# Patient Record
Sex: MALE | Race: WHITE | NOT HISPANIC OR LATINO | Employment: OTHER | URBAN - METROPOLITAN AREA
[De-identification: names, ages, dates, MRNs, and addresses within clinical notes are randomized per-mention and may not be internally consistent; named-entity substitution may affect disease eponyms.]

---

## 2017-08-14 ENCOUNTER — RECORDS - HEALTHEAST (OUTPATIENT)
Dept: LAB | Facility: CLINIC | Age: 82
End: 2017-08-14

## 2017-08-14 LAB
CHOLEST SERPL-MCNC: 239 MG/DL
FASTING STATUS PATIENT QL REPORTED: ABNORMAL
HDLC SERPL-MCNC: 49 MG/DL
LDLC SERPL CALC-MCNC: 164 MG/DL
TRIGL SERPL-MCNC: 131 MG/DL

## 2019-02-18 ENCOUNTER — RECORDS - HEALTHEAST (OUTPATIENT)
Dept: LAB | Facility: CLINIC | Age: 84
End: 2019-02-18

## 2019-02-18 LAB
ANION GAP SERPL CALCULATED.3IONS-SCNC: 9 MMOL/L (ref 5–18)
BUN SERPL-MCNC: 10 MG/DL (ref 8–28)
CALCIUM SERPL-MCNC: 10.3 MG/DL (ref 8.5–10.5)
CHLORIDE BLD-SCNC: 104 MMOL/L (ref 98–107)
CHOLEST SERPL-MCNC: 257 MG/DL
CO2 SERPL-SCNC: 27 MMOL/L (ref 22–31)
CREAT SERPL-MCNC: 1.11 MG/DL (ref 0.7–1.3)
FASTING STATUS PATIENT QL REPORTED: ABNORMAL
GFR SERPL CREATININE-BSD FRML MDRD: >60 ML/MIN/1.73M2
GLUCOSE BLD-MCNC: 121 MG/DL (ref 70–125)
HDLC SERPL-MCNC: 56 MG/DL
LDLC SERPL CALC-MCNC: 167 MG/DL
POTASSIUM BLD-SCNC: 4.1 MMOL/L (ref 3.5–5)
SODIUM SERPL-SCNC: 140 MMOL/L (ref 136–145)
TRIGL SERPL-MCNC: 168 MG/DL

## 2020-02-06 ENCOUNTER — RECORDS - HEALTHEAST (OUTPATIENT)
Dept: LAB | Facility: CLINIC | Age: 85
End: 2020-02-06

## 2020-02-06 LAB
ANION GAP SERPL CALCULATED.3IONS-SCNC: 9 MMOL/L (ref 5–18)
BUN SERPL-MCNC: 12 MG/DL (ref 8–28)
CALCIUM SERPL-MCNC: 10.2 MG/DL (ref 8.5–10.5)
CHLORIDE BLD-SCNC: 103 MMOL/L (ref 98–107)
CHOLEST SERPL-MCNC: 231 MG/DL
CO2 SERPL-SCNC: 28 MMOL/L (ref 22–31)
CREAT SERPL-MCNC: 1.12 MG/DL (ref 0.7–1.3)
FASTING STATUS PATIENT QL REPORTED: ABNORMAL
GFR SERPL CREATININE-BSD FRML MDRD: >60 ML/MIN/1.73M2
GLUCOSE BLD-MCNC: 116 MG/DL (ref 70–125)
HDLC SERPL-MCNC: 51 MG/DL
LDLC SERPL CALC-MCNC: 152 MG/DL
POTASSIUM BLD-SCNC: 3.8 MMOL/L (ref 3.5–5)
SODIUM SERPL-SCNC: 140 MMOL/L (ref 136–145)
TRIGL SERPL-MCNC: 142 MG/DL

## 2020-08-11 ENCOUNTER — RECORDS - HEALTHEAST (OUTPATIENT)
Dept: LAB | Facility: CLINIC | Age: 85
End: 2020-08-11

## 2020-08-11 LAB — POTASSIUM BLD-SCNC: 3.6 MMOL/L (ref 3.5–5)

## 2021-02-09 ENCOUNTER — RECORDS - HEALTHEAST (OUTPATIENT)
Dept: LAB | Facility: CLINIC | Age: 86
End: 2021-02-09

## 2021-02-09 LAB
ANION GAP SERPL CALCULATED.3IONS-SCNC: 8 MMOL/L (ref 5–18)
BUN SERPL-MCNC: 11 MG/DL (ref 8–28)
CALCIUM SERPL-MCNC: 10.3 MG/DL (ref 8.5–10.5)
CHLORIDE BLD-SCNC: 104 MMOL/L (ref 98–107)
CHOLEST SERPL-MCNC: 242 MG/DL
CO2 SERPL-SCNC: 28 MMOL/L (ref 22–31)
CREAT SERPL-MCNC: 1.16 MG/DL (ref 0.7–1.3)
FASTING STATUS PATIENT QL REPORTED: ABNORMAL
GFR SERPL CREATININE-BSD FRML MDRD: 59 ML/MIN/1.73M2
GLUCOSE BLD-MCNC: 101 MG/DL (ref 70–125)
HDLC SERPL-MCNC: 54 MG/DL
LDLC SERPL CALC-MCNC: 148 MG/DL
POTASSIUM BLD-SCNC: 4.1 MMOL/L (ref 3.5–5)
SODIUM SERPL-SCNC: 140 MMOL/L (ref 136–145)
TRIGL SERPL-MCNC: 202 MG/DL

## 2021-08-10 ENCOUNTER — LAB REQUISITION (OUTPATIENT)
Dept: LAB | Facility: CLINIC | Age: 86
End: 2021-08-10
Payer: MEDICARE

## 2021-08-10 DIAGNOSIS — I10 ESSENTIAL (PRIMARY) HYPERTENSION: ICD-10-CM

## 2021-08-10 LAB — POTASSIUM BLD-SCNC: 4.1 MMOL/L (ref 3.5–5)

## 2021-08-10 PROCEDURE — 84132 ASSAY OF SERUM POTASSIUM: CPT | Performed by: FAMILY MEDICINE

## 2022-02-08 ENCOUNTER — LAB REQUISITION (OUTPATIENT)
Dept: LAB | Facility: CLINIC | Age: 87
End: 2022-02-08
Payer: MEDICARE

## 2022-02-08 DIAGNOSIS — G31.84 MILD COGNITIVE IMPAIRMENT, SO STATED: ICD-10-CM

## 2022-02-08 DIAGNOSIS — I10 ESSENTIAL (PRIMARY) HYPERTENSION: ICD-10-CM

## 2022-02-08 DIAGNOSIS — E78.2 MIXED HYPERLIPIDEMIA: ICD-10-CM

## 2022-02-08 LAB
ANION GAP SERPL CALCULATED.3IONS-SCNC: 13 MMOL/L (ref 5–18)
BUN SERPL-MCNC: 12 MG/DL (ref 8–28)
CALCIUM SERPL-MCNC: 10.7 MG/DL (ref 8.5–10.5)
CHLORIDE BLD-SCNC: 101 MMOL/L (ref 98–107)
CHOLEST SERPL-MCNC: 228 MG/DL
CO2 SERPL-SCNC: 24 MMOL/L (ref 22–31)
CREAT SERPL-MCNC: 1.12 MG/DL (ref 0.7–1.3)
FASTING STATUS PATIENT QL REPORTED: ABNORMAL
FOLATE SERPL-MCNC: 18.3 NG/ML
GFR SERPL CREATININE-BSD FRML MDRD: 62 ML/MIN/1.73M2
GLUCOSE BLD-MCNC: 104 MG/DL (ref 70–125)
HDLC SERPL-MCNC: 49 MG/DL
LDLC SERPL CALC-MCNC: 148 MG/DL
POTASSIUM BLD-SCNC: 4.1 MMOL/L (ref 3.5–5)
SODIUM SERPL-SCNC: 138 MMOL/L (ref 136–145)
TRIGL SERPL-MCNC: 154 MG/DL
TSH SERPL DL<=0.005 MIU/L-ACNC: 1.01 UIU/ML (ref 0.3–5)
VIT B12 SERPL-MCNC: 522 PG/ML (ref 213–816)

## 2022-02-08 PROCEDURE — 84443 ASSAY THYROID STIM HORMONE: CPT | Mod: ORL | Performed by: FAMILY MEDICINE

## 2022-02-08 PROCEDURE — 82607 VITAMIN B-12: CPT | Mod: ORL | Performed by: FAMILY MEDICINE

## 2022-02-08 PROCEDURE — 80048 BASIC METABOLIC PNL TOTAL CA: CPT | Mod: ORL | Performed by: FAMILY MEDICINE

## 2022-02-08 PROCEDURE — 80061 LIPID PANEL: CPT | Mod: ORL | Performed by: FAMILY MEDICINE

## 2022-02-08 PROCEDURE — 82746 ASSAY OF FOLIC ACID SERUM: CPT | Mod: ORL | Performed by: FAMILY MEDICINE

## 2022-12-13 ENCOUNTER — LAB REQUISITION (OUTPATIENT)
Dept: LAB | Facility: CLINIC | Age: 87
End: 2022-12-13
Payer: MEDICARE

## 2022-12-13 DIAGNOSIS — I10 ESSENTIAL (PRIMARY) HYPERTENSION: ICD-10-CM

## 2022-12-13 LAB
ANION GAP SERPL CALCULATED.3IONS-SCNC: 14 MMOL/L (ref 7–15)
BUN SERPL-MCNC: 11.1 MG/DL (ref 8–23)
CALCIUM SERPL-MCNC: 10.1 MG/DL (ref 8.2–9.6)
CHLORIDE SERPL-SCNC: 103 MMOL/L (ref 98–107)
CREAT SERPL-MCNC: 1.1 MG/DL (ref 0.67–1.17)
DEPRECATED HCO3 PLAS-SCNC: 23 MMOL/L (ref 22–29)
GFR SERPL CREATININE-BSD FRML MDRD: 63 ML/MIN/1.73M2
GLUCOSE SERPL-MCNC: 116 MG/DL (ref 70–99)
POTASSIUM SERPL-SCNC: 3.9 MMOL/L (ref 3.4–5.3)
SODIUM SERPL-SCNC: 140 MMOL/L (ref 136–145)

## 2022-12-13 PROCEDURE — 80048 BASIC METABOLIC PNL TOTAL CA: CPT | Mod: ORL | Performed by: FAMILY MEDICINE

## 2023-03-03 ENCOUNTER — APPOINTMENT (OUTPATIENT)
Dept: RADIOLOGY | Facility: CLINIC | Age: 88
End: 2023-03-03
Attending: EMERGENCY MEDICINE
Payer: MEDICARE

## 2023-03-03 ENCOUNTER — APPOINTMENT (OUTPATIENT)
Dept: CT IMAGING | Facility: CLINIC | Age: 88
End: 2023-03-03
Attending: EMERGENCY MEDICINE
Payer: MEDICARE

## 2023-03-03 ENCOUNTER — HOSPITAL ENCOUNTER (EMERGENCY)
Facility: CLINIC | Age: 88
Discharge: HOME | End: 2023-03-03
Attending: EMERGENCY MEDICINE | Admitting: EMERGENCY MEDICINE
Payer: MEDICARE

## 2023-03-03 VITALS
TEMPERATURE: 98 F | SYSTOLIC BLOOD PRESSURE: 181 MMHG | DIASTOLIC BLOOD PRESSURE: 86 MMHG | WEIGHT: 185 LBS | OXYGEN SATURATION: 95 % | HEIGHT: 68 IN | BODY MASS INDEX: 28.04 KG/M2 | HEART RATE: 94 BPM | RESPIRATION RATE: 16 BRPM

## 2023-03-03 DIAGNOSIS — E04.1 THYROID NODULE: ICD-10-CM

## 2023-03-03 DIAGNOSIS — I71.40 ABDOMINAL AORTIC ANEURYSM (AAA) WITHOUT RUPTURE, UNSPECIFIED PART (H): ICD-10-CM

## 2023-03-03 DIAGNOSIS — U07.1 INFECTION DUE TO 2019 NOVEL CORONAVIRUS: ICD-10-CM

## 2023-03-03 DIAGNOSIS — R55 SYNCOPE AND COLLAPSE: ICD-10-CM

## 2023-03-03 LAB
ALBUMIN SERPL-MCNC: 3.7 G/DL (ref 3.5–5)
ALBUMIN UR-MCNC: 30 MG/DL
ALP SERPL-CCNC: 57 U/L (ref 45–120)
ALT SERPL W P-5'-P-CCNC: 16 U/L (ref 0–45)
ANION GAP SERPL CALCULATED.3IONS-SCNC: 10 MMOL/L (ref 5–18)
APPEARANCE UR: CLEAR
AST SERPL W P-5'-P-CCNC: 20 U/L (ref 0–40)
ATRIAL RATE - MUSE: 84 BPM
BASOPHILS # BLD AUTO: 0 10E3/UL (ref 0–0.2)
BASOPHILS NFR BLD AUTO: 1 %
BILIRUB SERPL-MCNC: 0.7 MG/DL (ref 0–1)
BILIRUB UR QL STRIP: NEGATIVE
BUN SERPL-MCNC: 11 MG/DL (ref 8–28)
CALCIUM SERPL-MCNC: 9.8 MG/DL (ref 8.5–10.5)
CHLORIDE BLD-SCNC: 106 MMOL/L (ref 98–107)
CO2 SERPL-SCNC: 25 MMOL/L (ref 22–31)
COLOR UR AUTO: YELLOW
CREAT SERPL-MCNC: 1.02 MG/DL (ref 0.7–1.3)
DIASTOLIC BLOOD PRESSURE - MUSE: NORMAL MMHG
EOSINOPHIL # BLD AUTO: 0.3 10E3/UL (ref 0–0.7)
EOSINOPHIL NFR BLD AUTO: 4 %
ERYTHROCYTE [DISTWIDTH] IN BLOOD BY AUTOMATED COUNT: 12.5 % (ref 10–15)
FLUAV RNA SPEC QL NAA+PROBE: NEGATIVE
FLUBV RNA RESP QL NAA+PROBE: NEGATIVE
GFR SERPL CREATININE-BSD FRML MDRD: 69 ML/MIN/1.73M2
GLUCOSE BLD-MCNC: 119 MG/DL (ref 70–125)
GLUCOSE UR STRIP-MCNC: NEGATIVE MG/DL
HCT VFR BLD AUTO: 44.1 % (ref 40–53)
HGB BLD-MCNC: 14.9 G/DL (ref 13.3–17.7)
HGB UR QL STRIP: NEGATIVE
IMM GRANULOCYTES # BLD: 0 10E3/UL
IMM GRANULOCYTES NFR BLD: 1 %
INTERPRETATION ECG - MUSE: NORMAL
KETONES UR STRIP-MCNC: NEGATIVE MG/DL
LEUKOCYTE ESTERASE UR QL STRIP: NEGATIVE
LYMPHOCYTES # BLD AUTO: 0.8 10E3/UL (ref 0.8–5.3)
LYMPHOCYTES NFR BLD AUTO: 12 %
MAGNESIUM SERPL-MCNC: 1.7 MG/DL (ref 1.8–2.6)
MCH RBC QN AUTO: 30.2 PG (ref 26.5–33)
MCHC RBC AUTO-ENTMCNC: 33.8 G/DL (ref 31.5–36.5)
MCV RBC AUTO: 90 FL (ref 78–100)
MONOCYTES # BLD AUTO: 0.6 10E3/UL (ref 0–1.3)
MONOCYTES NFR BLD AUTO: 9 %
MUCOUS THREADS #/AREA URNS LPF: PRESENT /LPF
NEUTROPHILS # BLD AUTO: 4.9 10E3/UL (ref 1.6–8.3)
NEUTROPHILS NFR BLD AUTO: 73 %
NITRATE UR QL: NEGATIVE
NRBC # BLD AUTO: 0 10E3/UL
NRBC BLD AUTO-RTO: 0 /100
P AXIS - MUSE: 73 DEGREES
PH UR STRIP: 6 [PH] (ref 5–7)
PLATELET # BLD AUTO: 210 10E3/UL (ref 150–450)
POTASSIUM BLD-SCNC: 4.1 MMOL/L (ref 3.5–5)
PR INTERVAL - MUSE: 178 MS
PROT SERPL-MCNC: 7.6 G/DL (ref 6–8)
QRS DURATION - MUSE: 82 MS
QT - MUSE: 354 MS
QTC - MUSE: 418 MS
R AXIS - MUSE: 40 DEGREES
RBC # BLD AUTO: 4.93 10E6/UL (ref 4.4–5.9)
RBC URINE: 2 /HPF
RSV RNA SPEC NAA+PROBE: NEGATIVE
SARS-COV-2 RNA RESP QL NAA+PROBE: POSITIVE
SODIUM SERPL-SCNC: 141 MMOL/L (ref 136–145)
SP GR UR STRIP: 1.02 (ref 1–1.03)
SYSTOLIC BLOOD PRESSURE - MUSE: NORMAL MMHG
T AXIS - MUSE: 55 DEGREES
TROPONIN I SERPL-MCNC: 0.02 NG/ML (ref 0–0.29)
UROBILINOGEN UR STRIP-MCNC: <2 MG/DL
VENTRICULAR RATE- MUSE: 84 BPM
WBC # BLD AUTO: 6.6 10E3/UL (ref 4–11)
WBC URINE: 1 /HPF

## 2023-03-03 PROCEDURE — 250N000013 HC RX MED GY IP 250 OP 250 PS 637: Performed by: EMERGENCY MEDICINE

## 2023-03-03 PROCEDURE — 85004 AUTOMATED DIFF WBC COUNT: CPT | Performed by: EMERGENCY MEDICINE

## 2023-03-03 PROCEDURE — 83735 ASSAY OF MAGNESIUM: CPT | Performed by: EMERGENCY MEDICINE

## 2023-03-03 PROCEDURE — 87637 SARSCOV2&INF A&B&RSV AMP PRB: CPT | Performed by: EMERGENCY MEDICINE

## 2023-03-03 PROCEDURE — 71046 X-RAY EXAM CHEST 2 VIEWS: CPT

## 2023-03-03 PROCEDURE — 84484 ASSAY OF TROPONIN QUANT: CPT | Performed by: EMERGENCY MEDICINE

## 2023-03-03 PROCEDURE — 80053 COMPREHEN METABOLIC PANEL: CPT | Performed by: EMERGENCY MEDICINE

## 2023-03-03 PROCEDURE — 250N000011 HC RX IP 250 OP 636: Performed by: EMERGENCY MEDICINE

## 2023-03-03 PROCEDURE — 73502 X-RAY EXAM HIP UNI 2-3 VIEWS: CPT

## 2023-03-03 PROCEDURE — 81001 URINALYSIS AUTO W/SCOPE: CPT | Performed by: EMERGENCY MEDICINE

## 2023-03-03 PROCEDURE — 72125 CT NECK SPINE W/O DYE: CPT | Mod: MA

## 2023-03-03 PROCEDURE — 36415 COLL VENOUS BLD VENIPUNCTURE: CPT | Performed by: EMERGENCY MEDICINE

## 2023-03-03 PROCEDURE — 93005 ELECTROCARDIOGRAM TRACING: CPT | Performed by: EMERGENCY MEDICINE

## 2023-03-03 PROCEDURE — 72131 CT LUMBAR SPINE W/O DYE: CPT | Mod: MA

## 2023-03-03 PROCEDURE — 99285 EMERGENCY DEPT VISIT HI MDM: CPT | Mod: 25,CS

## 2023-03-03 PROCEDURE — 71275 CT ANGIOGRAPHY CHEST: CPT | Mod: MA

## 2023-03-03 PROCEDURE — 70450 CT HEAD/BRAIN W/O DYE: CPT | Mod: MA

## 2023-03-03 PROCEDURE — C9803 HOPD COVID-19 SPEC COLLECT: HCPCS

## 2023-03-03 RX ORDER — ACETAMINOPHEN 325 MG/1
975 TABLET ORAL ONCE
Status: COMPLETED | OUTPATIENT
Start: 2023-03-03 | End: 2023-03-03

## 2023-03-03 RX ORDER — IOPAMIDOL 755 MG/ML
100 INJECTION, SOLUTION INTRAVASCULAR ONCE
Status: COMPLETED | OUTPATIENT
Start: 2023-03-03 | End: 2023-03-03

## 2023-03-03 RX ADMIN — IOPAMIDOL 100 ML: 755 INJECTION, SOLUTION INTRAVENOUS at 17:03

## 2023-03-03 RX ADMIN — ACETAMINOPHEN 975 MG: 325 TABLET ORAL at 15:32

## 2023-03-03 ASSESSMENT — ENCOUNTER SYMPTOMS
SHORTNESS OF BREATH: 1
NECK PAIN: 1
APPETITE CHANGE: 1
COUGH: 1
VOICE CHANGE: 1

## 2023-03-03 ASSESSMENT — ACTIVITIES OF DAILY LIVING (ADL)
ADLS_ACUITY_SCORE: 35
ADLS_ACUITY_SCORE: 35

## 2023-03-03 NOTE — ED PROVIDER NOTES
Emergency Department Encounter     Evaluation Date & Time:   3/3/2023  2:00 PM    CHIEF COMPLAINT:  Cough and Syncope      Triage Note:  Patient presents to ED after having syncopal this morning and one week ago, as well, has had had increased confusion the past month according to daughter, increased dizziness with movement the past week or so, reports hx of vertigo, reports some L sided hip pain after his fall today, he denies taking thinners, is able to bear weight, but painful, he reports that he fell into a wooden table, denies hitting head today, but did hit back of head one week ago.  Reports having a cough on and off for the past month.  Maggie Muñoz RN.......3/3/2023 9:48 AM     Triage Assessment     Row Name 23 [id number]       Triage Assessment (Adult)    Airway WDL WDL       Respiratory WDL    Respiratory WDL WDL       Skin Circulation/Temperature WDL    Skin Circulation/Temperature WDL WDL       Cardiac WDL    Cardiac WDL WDL       Peripheral/Neurovascular WDL    Peripheral Neurovascular WDL WDL       Cognitive/Neuro/Behavioral WDL    Cognitive/Neuro/Behavioral WDL WDL                  FINAL IMPRESSION:    ICD-10-CM    1. Infection due to 2019 novel coronavirus  U07.1 MyChart Care Companion COVID Pathway     [facility] Virtual Home Monitoring Referral: COVID      2. Syncope and collapse  R55       3. Thyroid nodule  E04.1       4. Abdominal aortic aneurysm (AAA) without rupture, unspecified part  I71.40           Impression and Plan     ED COURSE & MEDICAL DECISION MAKIN:50 PM I met the patient and performed my initial interview and exam.   4:53 PM I rechecked the patient and updated him/daughter on results.    6:19 PM I rechecked the patient and updated him/daughter on final results.  We discussed the plan for discharge and the patient is agreeable. Reviewed supportive cares, symptomatic treatment, outpatient follow up, and reasons to return to the Emergency Department. All questions and concerns were  addressed. Patient to be discharged by ED RN.     ED Course as of 03/03/23 1842   Fri Mar 03, 2023   1524 Fab is here today primarily because of 2 episodes of likely syncope that his family knows of within the past week.  However, he has been having increasing memory issues within the course of the last few months at least and so as this second episode of syncope was only discovered because they were not able to get a hold of him for a little while this morning, he certainly may have had more episodes.  He is on medication for his heart.  He does at least note that he feels dizzy before the episodes, but as no one has been there with him it is unclear if he has truly been noticing the dizziness before the episodes or if he is rationalizing why he found himself on the floor.  Regardless, differential includes arrhythmia, sensitivity to blood pressure medications were he is getting lightheaded with standing, dehydration contributing, and differential is more broad but already his lab work ordered from triage is back so we see that there is no UTI, his electrolytes are good without seeing any cause of it there, his EKG shows no evidence for ischemia and his troponin is also normal.  He is not severely anemic but I do think he is mildly dehydrated.  We will do CT scan of his head and cervical spine to rule out chronic subdural causing progression of memory loss in the setting of frequent falls or signs of NPH as he has a chronic wide-based gait that he uses a walker for.  Also, will look at cervical spine CT for occult fracture with repeat falls.  If this work-up is unremarkable then the discussion with daughter will be whether or not he will stay for observation given her current overcrowding situation or if he goes home with her staying with him and does follow-up through the clinic with likely home monitor.   1804 Patient's CAT scan of his chest shows no evidence for pulmonary embolism or extensive lung disease  from the COVID.  CT imaging of the head and spine shows no evidence for acute fracture or bleed.  Hip x-ray is also unremarkable.  Patient does have incidental findings of abdominal aortic aneurysm 2.7 cm and thyroid nodule which will need outpatient follow-up.   1805 Per nursing note patient was able to ambulate in the hallway without difficulty and maintained oxygen sats 93%       At the conclusion of the encounter I discussed the results of all the tests and the disposition. The questions were answered. The patient or family acknowledged understanding and was agreeable with the care plan.          0 minutes of critical care time        MEDICATIONS GIVEN IN THE EMERGENCY DEPARTMENT:  Medications   acetaminophen (TYLENOL) tablet 975 mg (975 mg Oral $Given 3/3/23 9708)   iopamidol (ISOVUE-370) solution 100 mL (100 mLs Intravenous $Given 3/3/23 6382)       NEW PRESCRIPTIONS STARTED AT TODAY'S ED VISIT:  New Prescriptions    No medications on file       HPI     The history is provided by the patient and a relative.        Fab Haywood is a 89 year old male with a pertinent history of HTN, HLD, COPD, asbestosis, who presents to this ED by private car accompanied by daughter for evaluation of syncope, cough.    Per patient's daughter, patient presents after two unwitnessed episodes of syncope with falls at home this week. The first episode occurred on 2/27 (4 days ago). Patient initially stated that he thought he had tripped on the carpet, then later stated he became dizzy and fell. This morning daughter called the patient, and he took a while to answer the phone. He stated that he had just gotten up from the floor after passing out. Patient reported hitting his head during both falls. When daughter helped him get dressed today, he was very shaky when he stood up. She also noted bruise on his hip. No prior history of frequent falls. Additionally, patient has had a raspy cough and decreased appetite for the past week.  "No COVID-19 testing. He uses inhalers. Over the past several months, he has been more forgetful. For example, he has been uncertain about his medication compliance and having some word finding difficulty. At one point he said he stopped amlodipine without doctor approval, then later denied this. Intermittently complaining of right posterior neck pain.     Per patient, he reports two episodes of syncope in the past week, including this morning, that occurred when he stood up to walk to kitchen after taking one of his medications. On both occasions, he became very dizzy when standing in the kitchen, then fell to the ground. He states he was down for only a few minutes, then crawled to living room and got into his chair. Patient endorses raspy voice, cough, and worsening shortness of breath for the past week. Lives in his house independently. Uses a walker/cane at baseline.     REVIEW OF SYSTEMS:  Review of Systems   Constitutional: Positive for appetite change (decreased).   HENT: Positive for voice change (raspy).    Respiratory: Positive for cough and shortness of breath.    Musculoskeletal: Positive for neck pain.     remainder of systems are all otherwise negative.        Medical History     No past medical history on file.    No past surgical history on file.    No family history on file.         No current outpatient medications on file.      Physical Exam     First Vitals:  Patient Vitals for the past 24 hrs:   BP Temp Temp src Pulse Resp SpO2 Height Weight   03/03/23 1733 -- -- -- -- -- 95 % -- --   03/03/23 1732 (!) 178/94 -- -- 82 -- -- -- --   03/03/23 1608 (!) 178/84 -- -- 94 -- 97 % -- --   03/03/23 1408 (!) 167/78 -- -- 83 16 96 % -- --   03/03/23 0942 (!) 162/95 98  F (36.7  C) Oral 91 18 95 % 1.727 m (5' 8\") 83.9 kg (185 lb)       PHYSICAL EXAM:   Constitutional:   In nad sitting up in a chair initially   HENT:  Normocephalic, posterior pharynx wnl, mucous membranes dry and moderately pink.   Eyes:  " PERRL, EOMI, Conjunctiva normal, No discharge, no scleral icterus.  Respiratory:  Breathing easily, lungs clear.   Cardiovascular:  nl s1s2. grade 3/6 systolic murmur, loudest at right upper sternal border. no rubs or gallops.  Peripheral pulses dp, pt, and radial are wnl.  tr peripheral edema   GI:  Bowel sounds normal, Soft, No tenderness, No flank tenderness, nondistended.  :No CVA tenderness.   Musculoskeletal:  Moves all extremities.  No erythematous or swollen major joints,   Integument:  Normal color  Neurologic:  Hard of hearing, wearing hearing aids. CN 2-12 otherwise intact. Alert & oriented x 3, Normal motor function, Normal sensory function, No focal deficits noted. No pronator drift. Normal speech.  Psychiatric:  Affect normal, Judgment normal, Mood normal.     Results     LAB AND RADIOLOGY:  All pertinent labs reviewed and interpreted  Results for orders placed or performed during the hospital encounter of 03/03/23   Chest XR,  PA & LAT     Status: None    Narrative    EXAM: XR CHEST 2 VIEWS  LOCATION: [facility]  DATE/TIME: 3/3/2023 4:06 PM    INDICATION: cough this last week, and fall syncope this last week  COMPARISON: 12/05/2013 CT.      Impression    IMPRESSION: There some benign dystrophic calcifications both mid lungs more prominent than left should be of no significance. No infiltrates. Chest otherwise negative.   Head CT w/o contrast     Status: None    Narrative    EXAM: CT HEAD W/O CONTRAST  LOCATION: [facility]  DATE/TIME: 3/3/2023 4:42 PM    INDICATION: recurrent falls syncope this week.  increasing memory issues the past few months.  COMPARISON: None.  TECHNIQUE: Routine CT Head without IV contrast. Multiplanar reformats. Dose reduction techniques were used.    FINDINGS:  INTRACRANIAL CONTENTS: Gray-white matter differentiation is maintained. No hemorrhage or extraaxial collection. No mass effect. Subarachnoid cisterns are patent.  Patchy white matter hypodensities are, while nonspecific, most compatible with chronic   microvascular ischemic changes. Proportional prominence of the ventricles and sulci is compatible with diffuse cerebral volume loss. There is somewhat disproportionate anterior temporal lobe volume loss, left greater than right.    VISUALIZED ORBITS/SINUSES/MASTOIDS: Bilateral lens replacements. Opacification of the left maxillary sinus and anterior ethmoid air cells with mild maxillary sinus wall thickening. No middle ear or mastoid effusion.    BONES/SOFT TISSUES: No acute abnormality.      Impression    IMPRESSION:  1.  No acute transcortical infarct, intracranial hemorrhage, or mass effect.   2.  Moderate diffuse cerebral volume loss and features compatible with chronic microangiopathic ischemic white matter changes.   3.  Left OMC obstruction pattern of paranasal sinus inflammatory changes with chronic features. Correlate clinically.       Cervical spine CT w/o contrast     Status: None    Narrative    EXAM: CT CERVICAL SPINE W/O CONTRAST  LOCATION: [facility]  DATE/TIME: 3/3/2023 4:52 PM    INDICATION: recurrent falls.  hit head.  upper neck pain to daughter this week,  COMPARISON: None.  TECHNIQUE: Routine CT Cervical Spine without IV contrast. Multiplanar reformats. Dose reduction techniques were used.    FINDINGS:  VERTEBRA: No acute fracture.  No acute post traumatic subluxations.   Normal vertebral body heights. Diffuse bony demineralization.    CANAL/FORAMINA: Multilevel spondylosis result in various levels and degrees of central canal stenosis and neural foraminal stenosis. C6-C7 severe central canal stenosis.   C4-C5 mild grade 1 anterolisthesis measuring 2 mm. C5-C6 grade 1 anterolisthesis   measuring 3 mm.    PARASPINAL: Bilateral thyroid lobe nodules largest measuring 2.3 cm in the right thyroid lobe. Recommend nonemergent thyroid ultrasound. Partially visualized left maxillary sinus  completely opacified.        Impression     IMPRESSION:  1.  No acute fracture.  2.  Diffuse bony demineralization.   3.  Degenerative changes described above.  4.  Thyroid nodules described above. Recommend nonemergent thyroid ultrasound.     XR Pelvis and Hip Left 2 Views     Status: None    Narrative    EXAM: XR PELVIS AND HIP LEFT 2 VIEWS  LOCATION: [facility]  DATE/TIME: 3/3/2023 4:06 PM    INDICATION: recurrent falls, some hip pain  COMPARISON: None.      Impression    IMPRESSION: Both hips are negative for fracture. Pelvis negative for fracture. Diffuse demineralization.   Lumbar spine CT w/o contrast     Status: None    Narrative    EXAM: CT LUMBAR SPINE W/O CONTRAST  LOCATION: [facility]  DATE/TIME: 3/3/2023 4:52 PM    INDICATION: recurrent falls with lower back pain into left hip  COMPARISON: None.  TECHNIQUE: Routine CT Lumbar Spine without IV contrast. Multiplanar reformats. Dose reduction techniques were used.     FINDINGS:  S1-S2 prominent disc space.    VERTEBRA: No acute fracture.  No acute post traumatic subluxations.   Normal vertebral body heights. Diffuse bony demineralization.    T12 vertebral body demonstrates a small sclerotic lesion nonspecific likely incidental bone island in the absence of a known malignancy.     CANAL/FORAMINA: Thoracolumbar mild levoscoliosis. Lumbar mild dextroscoliosis. Multilevel spondylosis result in various levels and degrees of central canal stenosis and neural foraminal stenosis. L4-L5 and L5-S1 severe central canal stenosis.   No   significant subluxations. Bilateral SI degenerative joint disease.    PARASPINAL: Abdominal aortic aneurysm measuring 2.7 cm.        Impression     IMPRESSION:  1.  No acute fracture.  2.  Diffuse bony demineralization.  3.  Degenerative changes described above.  4.  Abdominal aortic aneurysm described above.     CT Chest Pulmonary Embolism w Contrast     Status: None    Narrative     EXAM: CT CHEST PULMONARY EMBOLISM W CONTRAST  LOCATION: [facility]  DATE/TIME: 3/3/2023 5:12 PM    INDICATION: covid positive with syncope this week  COMPARISON: 12/05/2013  TECHNIQUE: CT chest pulmonary angiogram during arterial phase injection of IV contrast. Multiplanar reformats and MIP reconstructions were performed. Dose reduction techniques were used.   CONTRAST: Isovue 370 100mL    FINDINGS:  ANGIOGRAM CHEST: Pulmonary arteries are enlarged, but negative for pulmonary emboli. Thoracic aorta is negative for dissection. No CT evidence of right heart strain.    LUNGS AND PLEURA: Lungs are clear. No pleural effusion. Unchanged subpleural calcified nodules in the upper lobes.    MEDIASTINUM/AXILLAE: Unchanged 2.0 cm right thyroid nodule, which does not require additional workup. No enlarged thoracic lymph nodes. No thoracic aortic aneurysm.    CORONARY ARTERY CALCIFICATION: Mild.    UPPER ABDOMEN: Normal.    MUSCULOSKELETAL: Degenerative changes in the spine.      Impression    IMPRESSION:  No pulmonary embolism or other acute finding in the chest.   Comprehensive metabolic panel     Status: Normal   Result Value Ref Range    Sodium 141 136 - 145 mmol/L    Potassium 4.1 3.5 - 5.0 mmol/L    Chloride 106 98 - 107 mmol/L    Carbon Dioxide (CO2) 25 22 - 31 mmol/L    Anion Gap 10 5 - 18 mmol/L    Urea Nitrogen 11 8 - 28 mg/dL    Creatinine 1.02 0.70 - 1.30 mg/dL    Calcium 9.8 8.5 - 10.5 mg/dL    Glucose 119 70 - 125 mg/dL    Alkaline Phosphatase 57 45 - 120 U/L    AST 20 0 - 40 U/L    ALT 16 0 - 45 U/L    Protein Total 7.6 6.0 - 8.0 g/dL    Albumin 3.7 3.5 - 5.0 g/dL    Bilirubin Total 0.7 0.0 - 1.0 mg/dL    GFR Estimate 69 >60 mL/min/1.73m2   Troponin I     Status: Normal   Result Value Ref Range    Troponin I 0.02 0.00 - 0.29 ng/mL   Magnesium     Status: Abnormal   Result Value Ref Range    Magnesium 1.7 (L) 1.8 - 2.6 mg/dL   UA with Microscopic reflex to Culture     Status: Abnormal     Specimen: Urine, Clean Catch   Result Value Ref Range    Color Urine Yellow Colorless, Straw, Light Yellow, Yellow    Appearance Urine Clear Clear    Glucose Urine Negative Negative mg/dL    Bilirubin Urine Negative Negative    Ketones Urine Negative Negative mg/dL    Specific Gravity Urine 1.024 1.001 - 1.030    Blood Urine Negative Negative    pH Urine 6.0 5.0 - 7.0    Protein Albumin Urine 30 (A) Negative mg/dL    Urobilinogen Urine <2.0 <2.0 mg/dL    Nitrite Urine Negative Negative    Leukocyte Esterase Urine Negative Negative    Mucus Urine Present (A) None Seen /LPF    RBC Urine 2 <=2 /HPF    WBC Urine 1 <=5 /HPF    Narrative    Urine Culture not indicated   CBC with platelets and differential     Status: None   Result Value Ref Range    WBC Count 6.6 4.0 - 11.0 10e3/uL    RBC Count 4.93 4.40 - 5.90 10e6/uL    Hemoglobin 14.9 13.3 - 17.7 g/dL    Hematocrit 44.1 40.0 - 53.0 %    MCV 90 78 - 100 fL    MCH 30.2 26.5 - 33.0 pg    MCHC 33.8 31.5 - 36.5 g/dL    RDW 12.5 10.0 - 15.0 %    Platelet Count 210 150 - 450 10e3/uL    % Neutrophils 73 %    % Lymphocytes 12 %    % Monocytes 9 %    % Eosinophils 4 %    % Basophils 1 %    % Immature Granulocytes 1 %    NRBCs per 100 WBC 0 <1 /100    Absolute Neutrophils 4.9 1.6 - 8.3 10e3/uL    Absolute Lymphocytes 0.8 0.8 - 5.3 10e3/uL    Absolute Monocytes 0.6 0.0 - 1.3 10e3/uL    Absolute Eosinophils 0.3 0.0 - 0.7 10e3/uL    Absolute Basophils 0.0 0.0 - 0.2 10e3/uL    Absolute Immature Granulocytes 0.0 <=0.4 10e3/uL    Absolute NRBCs 0.0 10e3/uL   Symptomatic Influenza A/B, RSV, & SARS-CoV2 PCR (COVID-19) Nasopharyngeal     Status: Abnormal    Specimen: Nasopharyngeal; Swab   Result Value Ref Range    Influenza A PCR Negative Negative    Influenza B PCR Negative Negative    RSV PCR Negative Negative    SARS CoV2 PCR Positive (A) Negative    Narrative    Testing was performed using the Xpert Xpress CoV2/Flu/RSV Assay on the [employer] Instrument. This test should be  ordered for the detection of SARS-CoV-2, influenza, and RSV viruses in individuals who meet clinical and/or epidemiological criteria. Test performance is unknown in asymptomatic patients. This test is for in vitro diagnostic use under the FDA EUA for laboratories certified under CLIA to perform high or moderate complexity testing. This test has not been FDA cleared or approved. A negative result does not rule out the presence of PCR inhibitors in the specimen or target RNA in concentration below the limit of detection for the assay. If only one viral target is positive but coinfection with multiple targets is suspected, the sample should be re-tested with another FDA cleared, approved, or authorized test, if coinfection would change clinical management. This test was validated by the [facility]. These laboratories are certified under the Clinical Laboratory Improvement Amendments of 1988 (CLIA-88) as qualified to perform high complexity laboratory testing.   ECG 12-LEAD WITH MUSE (LHE)     Status: None   Result Value Ref Range    Systolic Blood Pressure  mmHg    Diastolic Blood Pressure  mmHg    Ventricular Rate 84 BPM    Atrial Rate 84 BPM    SC Interval 178 ms    QRS Duration 82 ms     ms    QTc 418 ms    P Axis 73 degrees    R AXIS 40 degrees    T Axis 55 degrees    Interpretation ECG       Sinus rhythm with Premature atrial complexes  Septal infarct , age undetermined  Abnormal ECG  No previous ECGs available  Confirmed by SEE ED PROVIDER NOTE FOR, ECG INTERPRETATION ([id number]),  JOCELYN BARLOW ([id number]) on 3/3/2023 11:56:37 AM     CBC with platelets differential     Status: None    Narrative    The following orders were created for panel order CBC with platelets differential.  Procedure                               Abnormality         Status                     ---------                               -----------         ------                     CBC with platelets and  d...[[id number]]                      Final result                 Please view results for these tests on the individual orders.         ECG:    Performed at: 0946    Impression: nsr rate of 84, pac's, septal infarct pattern, age indeterminate, low voltage in limb leads, pr 178ms, qrs 82ms, qtc 418ms, prt axes 73 40 55.      I have independently reviewed and interpreted the EKS(s) documented above    PROCEDURES:  Procedures:      [facility] Documentation     Medical Decision Making    History:    Supplemental history from: Documented in chart, if applicable and Family Member/Significant Other    External Record(s) reviewed: Documented in chart, if applicable.    Work Up:    Chart documentation includes differential considered and any EKGs or imaging independently interpreted by provider, where specified.    In additional to work up documented, I considered the following work up: Documented in chart, if applicable.    External consultation:    Discussion of management with another provider: Documented in chart, if applicable    Complicating factors:    Care impacted by chronic illness: N/A and Hypertension    Care affected by social determinants of health: N/A    Disposition considerations: Discharge. I recommended the patient continue their current prescription strength medication(s):  . See documentation for any additional details.               The creation of this record is based on the Sasha calero's, observations of the work being performed by Dr. Natty Harrington and the provider s statements to them. This document has been checked and approved by MD Natty Salinas MD  Emergency Medicine  [facility] EMERGENCY ROOM         Natty Harrington MD  03/03/23 4405

## 2023-03-03 NOTE — ED TRIAGE NOTES
Patient presents to ED after having syncopal this morning and one week ago, as well, has had had increased confusion the past month according to daughter, increased dizziness with movement the past week or so, reports hx of vertigo, reports some L sided hip pain after his fall today, he denies taking thinners, is able to bear weight, but painful, he reports that he fell into a wooden table, denies hitting head today, but did hit back of head one week ago.  Reports having a cough on and off for the past month.  Maggie Muñoz RN.......3/3/2023 9:48 AM     Triage Assessment     Row Name 03/03/23 0944       Triage Assessment (Adult)    Airway WDL WDL       Respiratory WDL    Respiratory WDL WDL       Skin Circulation/Temperature WDL    Skin Circulation/Temperature WDL WDL       Cardiac WDL    Cardiac WDL WDL       Peripheral/Neurovascular WDL    Peripheral Neurovascular WDL WDL       Cognitive/Neuro/Behavioral WDL    Cognitive/Neuro/Behavioral WDL WDL

## 2023-03-03 NOTE — ED NOTES
"STAT was called and both triage nurses were busy.  Patient and two family members bringing patient in for dizziness, light headedness for 1-2 weeks.  \"Passed out\" twice this week.  Able to say ABCs to P without stopping.  OK to wait for triage nurse.  kjs  "

## 2023-03-04 ENCOUNTER — PATIENT OUTREACH (OUTPATIENT)
Dept: CARE COORDINATION | Facility: CLINIC | Age: 88
End: 2023-03-04
Payer: MEDICARE

## 2023-03-04 NOTE — PROGRESS NOTES
Clinic Care Coordination Contact    Referral Type: Virtual Home Monitoring - Epic Care Companion    Patient referred for Virtual Home Monitoring Program for either COVID-19 or Community Acquired Pneumonia diagnosis following recent [facility] ED visit.  Epic Care Companion referral was also placed by provider.    Criteria for Virtual Home Monitoring telephone outreach is not met after review of ED encounter/ED provider note because:    1) ED provider note indicates assessment was negative for respiratory distress. O2 sats were stable throughout course of ED visit per chart review.      2) Patient was not discharged with new supplemental oxygen.    Per notes, ED provider and/or ED care team discussed appropriate follow up guidelines with patient prior to discharge or reflected these instructions on AVS.       Care Companion team remains available to support patient via [employer] account once activated by patient.     Samantha Balbuena RN  [facility]  - RN Care Coordinator

## 2023-03-04 NOTE — DISCHARGE INSTRUCTIONS
Your symptoms started a week ago and so the treatments for COVID generally have not been found to make a difference at this point in your illness.  The best thing for you is to rest is much as possible and to keep well-hydrated.    I do recommend having family member stay with you to assist you when you are up moving around as you are likely more weak than usual.    You also have a thyroid nodule or 2 that will need follow-up with your primary care physician.  Talk to them about this when you see them in a week.    Lastly you do have a small abdominal aortic aneurysm.  Again, speak with your primary care physician about this there may be nothing that needs to be done except monitoring and making sure that you are on the correct blood pressure medications.    If you continue to weaken and to do feel that you are unsafe at home certainly please do return to the emergency department for repeat evaluation.

## 2023-03-10 ENCOUNTER — LAB REQUISITION (OUTPATIENT)
Dept: LAB | Facility: CLINIC | Age: 88
End: 2023-03-10
Payer: MEDICARE

## 2023-03-10 DIAGNOSIS — E04.1 NONTOXIC SINGLE THYROID NODULE: ICD-10-CM

## 2023-03-10 LAB — TSH SERPL DL<=0.005 MIU/L-ACNC: 1.31 UIU/ML (ref 0.3–4.2)

## 2023-03-10 PROCEDURE — 84443 ASSAY THYROID STIM HORMONE: CPT | Mod: ORL | Performed by: STUDENT IN AN ORGANIZED HEALTH CARE EDUCATION/TRAINING PROGRAM

## 2023-10-17 ENCOUNTER — LAB REQUISITION (OUTPATIENT)
Dept: LAB | Facility: CLINIC | Age: 88
End: 2023-10-17
Payer: MEDICARE

## 2023-10-17 DIAGNOSIS — I10 ESSENTIAL (PRIMARY) HYPERTENSION: ICD-10-CM

## 2023-10-17 LAB
ALBUMIN SERPL BCG-MCNC: 4.5 G/DL (ref 3.5–5.2)
ALP SERPL-CCNC: 69 U/L (ref 40–129)
ALT SERPL W P-5'-P-CCNC: 14 U/L (ref 0–70)
ANION GAP SERPL CALCULATED.3IONS-SCNC: 13 MMOL/L (ref 7–15)
AST SERPL W P-5'-P-CCNC: 16 U/L (ref 0–45)
BILIRUB SERPL-MCNC: 0.5 MG/DL
BUN SERPL-MCNC: 11.6 MG/DL (ref 8–23)
CALCIUM SERPL-MCNC: 10.4 MG/DL (ref 8.2–9.6)
CHLORIDE SERPL-SCNC: 102 MMOL/L (ref 98–107)
CREAT SERPL-MCNC: 1.04 MG/DL (ref 0.67–1.17)
DEPRECATED HCO3 PLAS-SCNC: 23 MMOL/L (ref 22–29)
EGFRCR SERPLBLD CKD-EPI 2021: 67 ML/MIN/1.73M2
GLUCOSE SERPL-MCNC: 123 MG/DL (ref 70–99)
POTASSIUM SERPL-SCNC: 3.9 MMOL/L (ref 3.4–5.3)
PROT SERPL-MCNC: 7.3 G/DL (ref 6.4–8.3)
SODIUM SERPL-SCNC: 138 MMOL/L (ref 135–145)

## 2023-10-17 PROCEDURE — 80053 COMPREHEN METABOLIC PANEL: CPT | Mod: ORL | Performed by: FAMILY MEDICINE

## 2024-04-22 ENCOUNTER — LAB REQUISITION (OUTPATIENT)
Dept: LAB | Facility: CLINIC | Age: 89
End: 2024-04-22
Payer: MEDICARE

## 2024-04-22 DIAGNOSIS — M25.461 EFFUSION, RIGHT KNEE: ICD-10-CM

## 2024-04-22 LAB — ERYTHROCYTE [SEDIMENTATION RATE] IN BLOOD BY WESTERGREN METHOD: 24 MM/HR (ref 0–20)

## 2024-04-22 PROCEDURE — 85652 RBC SED RATE AUTOMATED: CPT | Mod: ORL | Performed by: FAMILY MEDICINE

## 2025-04-29 ENCOUNTER — LAB REQUISITION (OUTPATIENT)
Dept: LAB | Facility: CLINIC | Age: OVER 89
End: 2025-04-29
Payer: MEDICARE

## 2025-04-29 DIAGNOSIS — I10 ESSENTIAL (PRIMARY) HYPERTENSION: ICD-10-CM

## 2025-04-29 LAB
ANION GAP SERPL CALCULATED.3IONS-SCNC: 11 MMOL/L (ref 7–15)
BUN SERPL-MCNC: 14.7 MG/DL (ref 8–23)
CALCIUM SERPL-MCNC: 10.2 MG/DL (ref 8.8–10.4)
CHLORIDE SERPL-SCNC: 101 MMOL/L (ref 98–107)
CREAT SERPL-MCNC: 1.14 MG/DL (ref 0.67–1.17)
EGFRCR SERPLBLD CKD-EPI 2021: 60 ML/MIN/1.73M2
GLUCOSE SERPL-MCNC: 126 MG/DL (ref 70–99)
HCO3 SERPL-SCNC: 25 MMOL/L (ref 22–29)
POTASSIUM SERPL-SCNC: 3.5 MMOL/L (ref 3.4–5.3)
SODIUM SERPL-SCNC: 137 MMOL/L (ref 135–145)